# Patient Record
Sex: MALE | Race: WHITE
[De-identification: names, ages, dates, MRNs, and addresses within clinical notes are randomized per-mention and may not be internally consistent; named-entity substitution may affect disease eponyms.]

---

## 2018-03-14 ENCOUNTER — HOSPITAL ENCOUNTER (EMERGENCY)
Dept: HOSPITAL 53 - M ED | Age: 28
Discharge: HOME | End: 2018-03-14
Payer: COMMERCIAL

## 2018-03-14 DIAGNOSIS — L02.01: Primary | ICD-10-CM

## 2018-03-14 LAB
BASO #: 0 10^3/UL (ref 0–0.2)
BASO %: 0.1 % (ref 0–1)
CRP SERPL-MCNC: 6.58 MG/DL (ref 0–0.3)
EOS #: 0 10^3/UL (ref 0–0.5)
EOSINOPHIL NFR BLD AUTO: 0.3 % (ref 0–3)
HEMATOCRIT: 44.6 % (ref 42–52)
HEMOGLOBIN: 15 G/DL (ref 14–18)
IMMATURE GRANULOCYTE %: 0.5 % (ref 0–3)
LYMPH #: 1.3 10^3/UL (ref 1.5–6.5)
LYMPH %: 8.2 % (ref 24–44)
MEAN CORPUSCULAR HEMOGLOBIN: 27.9 PG (ref 27–33)
MEAN CORPUSCULAR HGB CONC: 33.6 G/DL (ref 32–36.5)
MEAN CORPUSCULAR VOLUME: 83.1 FL (ref 80–96)
MONO #: 1 10^3/UL (ref 0–0.8)
MONO %: 6.7 % (ref 0–5)
NEUTROPHILS #: 13 10^3/UL (ref 1.8–7.7)
NEUTROPHILS %: 84.2 % (ref 36–66)
NRBC BLD AUTO-RTO: 0 % (ref 0–0)
PLATELET COUNT, AUTOMATED: 230 10^3/UL (ref 150–450)
RED BLOOD COUNT: 5.37 10^6/UL (ref 4.3–6.1)
RED CELL DISTRIBUTION WIDTH: 11.9 % (ref 11.5–14.5)
WHITE BLOOD COUNT: 15.4 10^3/UL (ref 4–10)

## 2018-03-14 PROCEDURE — 86140 C-REACTIVE PROTEIN: CPT

## 2018-03-14 RX ADMIN — CLINDAMYCIN PHOSPHATE 1 MLS/HR: 900 INJECTION, SOLUTION INTRAVENOUS at 10:09

## 2018-03-14 RX ADMIN — LIDOCAINE HYDROCHLORIDE 1 ML: 10 INJECTION, SOLUTION INFILTRATION; PERINEURAL at 10:30

## 2020-05-02 ENCOUNTER — HOSPITAL ENCOUNTER (EMERGENCY)
Dept: HOSPITAL 53 - M ED | Age: 30
Discharge: HOME | End: 2020-05-02
Payer: COMMERCIAL

## 2020-05-02 VITALS — HEIGHT: 68 IN | WEIGHT: 181.22 LBS | BODY MASS INDEX: 27.47 KG/M2

## 2020-05-02 VITALS — SYSTOLIC BLOOD PRESSURE: 136 MMHG | DIASTOLIC BLOOD PRESSURE: 69 MMHG
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
## 2020-05-02 DIAGNOSIS — S61.300A: Primary | ICD-10-CM

## 2020-05-02 DIAGNOSIS — Z23: ICD-10-CM
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
## 2020-05-04 NOTE — REP
Clinical:  Trauma.

 

Technique:  AP, lateral, bilateral oblique views right second digit .

 

Findings:  The osseous structures and joint spaces are intact and normal.  There

is no evidence for acute fracture or dislocation.  Surrounding soft tissues are

unremarkable.  No subcutaneous emphysema or radiodense foreign body.

 

Impression:

No acute fracture or dislocation.

 

 

Electronically Signed by

Taqueria Hagan MD 05/02/2020 06:49 P